# Patient Record
Sex: MALE | Race: ASIAN | NOT HISPANIC OR LATINO
[De-identification: names, ages, dates, MRNs, and addresses within clinical notes are randomized per-mention and may not be internally consistent; named-entity substitution may affect disease eponyms.]

---

## 2024-01-05 PROBLEM — Z00.00 ENCOUNTER FOR PREVENTIVE HEALTH EXAMINATION: Status: ACTIVE | Noted: 2024-01-05

## 2024-01-08 ENCOUNTER — APPOINTMENT (OUTPATIENT)
Dept: ORTHOPEDIC SURGERY | Facility: CLINIC | Age: 54
End: 2024-01-08
Payer: COMMERCIAL

## 2024-01-08 PROCEDURE — 99203 OFFICE O/P NEW LOW 30 MIN: CPT

## 2024-01-08 PROCEDURE — 73140 X-RAY EXAM OF FINGER(S): CPT | Mod: LT

## 2024-01-08 NOTE — IMAGING
[de-identified] : Left hand No wounds or deformity Mildly TTP at the MCP joint of the index finger Mild pain reproduced at the MCP of the index finger with grind Able to make a full composite fist nvi  3 views left index finger: No acute fractures, moderate degenerative changes noted at the MCP joint

## 2024-01-08 NOTE — HISTORY OF PRESENT ILLNESS
[de-identified] : 01/08/2024  SIN 53 year M is here for Location: Left index finger  Complaint: The patient reports the insidious onset of left index finger MCP joint pain associated with activities and heavy lifting.  No injuries that he can recall.  His pain is somewhat intermittent although he notes that he is increasingly modifying his activities to avoid this pain.  No triggering, clicking, numbness or tingling.  He is somewhat active and enjoys weightlifting. Onset: 2 months ago  Prior treatments: none Hand dominance: Left Occupation:  PMH: HTN Allergies: Erythromycin

## 2024-01-08 NOTE — DISCUSSION/SUMMARY
[de-identified] : Reviewed the etiology/pathophysiology of osteoarthritis today in layman's terms We discussed indications for both operative and nonoperative treatment Reviewed conservative treatments including the role for bracing, anti-inflammatory medications, injections and therapy Given the infrequent nature of his symptoms would continue with observation for now which he is in agreement with Could consider CSI in the future should his symptoms warrant it NSAIDs as needed for pain Follow-up as needed

## 2024-05-06 ENCOUNTER — APPOINTMENT (OUTPATIENT)
Dept: ORTHOPEDIC SURGERY | Facility: CLINIC | Age: 54
End: 2024-05-06
Payer: COMMERCIAL

## 2024-05-06 VITALS — WEIGHT: 190 LBS | HEIGHT: 70 IN | BODY MASS INDEX: 27.2 KG/M2

## 2024-05-06 DIAGNOSIS — M19.042 PRIMARY OSTEOARTHRITIS, LEFT HAND: ICD-10-CM

## 2024-05-06 DIAGNOSIS — E78.00 PURE HYPERCHOLESTEROLEMIA, UNSPECIFIED: ICD-10-CM

## 2024-05-06 DIAGNOSIS — I10 ESSENTIAL (PRIMARY) HYPERTENSION: ICD-10-CM

## 2024-05-06 PROCEDURE — 20600 DRAIN/INJ JOINT/BURSA W/O US: CPT | Mod: F1,LT

## 2024-05-06 PROCEDURE — J3490M: CUSTOM

## 2024-05-06 PROCEDURE — 99213 OFFICE O/P EST LOW 20 MIN: CPT | Mod: 25

## 2024-05-06 NOTE — HISTORY OF PRESENT ILLNESS
[de-identified] : Patient is a 53 year yo male presenting for evaluation of left index. Patient has been seen by Dr. Starks for left index MCP joint pain with no prior injury. Patient took time off lifting but pain has returned as he has gradually incorporated weight lifting again. Denies any injuries.

## 2024-05-06 NOTE — ASSESSMENT
[FreeTextEntry1] : Ongoing complaints of left index finger MCP pain with mild osteoarthritic changes noted on x-ray.  Has tried activity modification without improvement of symptoms - Left index finger corticosteroid injection given.  Ultrasound guidance used.  Tolerated well - Discussed use of Tylenol and ice as needed for injection site relief - Advised rest from heavy lifting activities for the next 3 to 4 days - Advised to continue bending lifting limbs at work - Follow-up as needed

## 2024-05-06 NOTE — IMAGING
[de-identified] : Left hand No wounds or deformity Mildly TTP at the MCP joint of the index finger Mild pain reproduced at the MCP of the index finger with grind Able to make a full composite fist nvi

## 2024-05-06 NOTE — PHYSICAL EXAM
[de-identified] : Constitutional: Well developed, well nourished, able to communicate Neuro: Normal sensation, No focal deficits Skin: Intact CV: Peripheral vascular exam grossly normal Pulm: No signs of respiratory distress Psych: Oriented, normal mood and affect

## 2024-05-06 NOTE — PROCEDURE
[FreeTextEntry3] : A pre-procedure verification was performed by asking the patient to state their name, , and the location of the procedure being performed in their own words.  A review of allergies was accomplished through dialog and the patient, ending thus in the full agreement. The risks and benefits were explained and consent were obtained verbally.  Procedure: Ultrasound Guided Injection >>  L index finger MCP Consent was obtained. Patients questions were answered to the best of my ability prior to procedure. Injection site and surrounding bony landmarks were palpated, and marked prior to proceeding. Target location and needle pathway identified under ultrasound. Ultrasound probe was sanitized in the typical fashion prior to application of sterile gel. Injection site was cleaned and prepped in the typical fashion using alcohol swabs. Cold spray used on skin for patient comfort. Site was identified once again with ultrasound. Needle was advanced into the L index MCP space from the dorsal approach. A combination of 0.5cc Marcaine w/o Epi and 20mg Depomedrol was then injected into the L index finger MCP. Patient tolerated the procedure well, without significant complications. Minimal (<3cc) blood loss experienced. Images were saved into patient's chart.

## 2025-01-07 ENCOUNTER — APPOINTMENT (OUTPATIENT)
Dept: ORTHOPEDIC SURGERY | Facility: CLINIC | Age: 55
End: 2025-01-07
Payer: COMMERCIAL

## 2025-01-07 DIAGNOSIS — M19.022 PRIMARY OSTEOARTHRITIS, LEFT ELBOW: ICD-10-CM

## 2025-01-07 PROCEDURE — 99203 OFFICE O/P NEW LOW 30 MIN: CPT

## 2025-01-07 PROCEDURE — 73080 X-RAY EXAM OF ELBOW: CPT | Mod: LT

## 2025-01-07 RX ORDER — MELOXICAM 7.5 MG/1
7.5 TABLET ORAL
Qty: 30 | Refills: 0 | Status: ACTIVE | COMMUNITY
Start: 2025-01-07 | End: 1900-01-01

## 2025-02-03 ENCOUNTER — APPOINTMENT (OUTPATIENT)
Dept: ORTHOPEDIC SURGERY | Facility: CLINIC | Age: 55
End: 2025-02-03